# Patient Record
Sex: MALE | Race: WHITE | NOT HISPANIC OR LATINO | Employment: OTHER
[De-identification: names, ages, dates, MRNs, and addresses within clinical notes are randomized per-mention and may not be internally consistent; named-entity substitution may affect disease eponyms.]

---

## 2018-10-17 ENCOUNTER — HOSPITAL (OUTPATIENT)
Dept: OTHER | Age: 73
End: 2018-10-17
Attending: FAMILY MEDICINE

## 2021-12-16 ENCOUNTER — EXTERNAL RECORD (OUTPATIENT)
Dept: HEALTH INFORMATION MANAGEMENT | Facility: OTHER | Age: 76
End: 2021-12-16

## 2022-02-01 ENCOUNTER — HOSPITAL ENCOUNTER (OUTPATIENT)
Dept: ULTRASOUND IMAGING | Age: 77
Discharge: HOME | End: 2022-02-01
Attending: INTERNAL MEDICINE

## 2022-02-01 DIAGNOSIS — T33.831A: ICD-10-CM

## 2022-02-01 DIAGNOSIS — T33.832A: ICD-10-CM

## 2022-02-01 PROCEDURE — 93925 LOWER EXTREMITY STUDY: CPT

## 2022-02-07 ENCOUNTER — TELEPHONE (OUTPATIENT)
Dept: CARDIOLOGY | Age: 77
End: 2022-02-07

## 2022-02-11 RX ORDER — LISINOPRIL 40 MG/1
40 TABLET ORAL DAILY
COMMUNITY
Start: 2021-12-16

## 2022-02-11 RX ORDER — HYDROCORTISONE 25 MG/G
1 CREAM TOPICAL EVERY 12 HOURS PRN
COMMUNITY
Start: 2022-01-21

## 2022-02-11 RX ORDER — ASPIRIN 81 MG/1
81 TABLET ORAL DAILY
COMMUNITY

## 2022-02-11 RX ORDER — METOPROLOL SUCCINATE 50 MG/1
50 TABLET, EXTENDED RELEASE ORAL DAILY
COMMUNITY
Start: 2021-12-16 | End: 2022-03-11 | Stop reason: ALTCHOICE

## 2022-02-11 RX ORDER — DICLOFENAC SODIUM 75 MG/1
75 TABLET, DELAYED RELEASE ORAL DAILY
COMMUNITY
Start: 2021-12-17

## 2022-02-11 RX ORDER — FAMOTIDINE 40 MG/1
40 TABLET, FILM COATED ORAL DAILY
COMMUNITY
Start: 2021-12-17

## 2022-02-11 RX ORDER — ATORVASTATIN CALCIUM 20 MG/1
20 TABLET, FILM COATED ORAL DAILY
COMMUNITY
Start: 2021-12-16

## 2022-02-25 ENCOUNTER — OFFICE VISIT (OUTPATIENT)
Dept: CARDIOLOGY | Age: 77
End: 2022-02-25

## 2022-02-25 VITALS
HEART RATE: 62 BPM | BODY MASS INDEX: 24.48 KG/M2 | WEIGHT: 171 LBS | HEIGHT: 70 IN | SYSTOLIC BLOOD PRESSURE: 170 MMHG | DIASTOLIC BLOOD PRESSURE: 80 MMHG

## 2022-02-25 DIAGNOSIS — I15.9 SECONDARY HYPERTENSION: Primary | ICD-10-CM

## 2022-02-25 DIAGNOSIS — E78.49 OTHER HYPERLIPIDEMIA: ICD-10-CM

## 2022-02-25 DIAGNOSIS — I73.9 PAD (PERIPHERAL ARTERY DISEASE) (CMD): ICD-10-CM

## 2022-02-25 PROCEDURE — 3079F DIAST BP 80-89 MM HG: CPT | Performed by: INTERNAL MEDICINE

## 2022-02-25 PROCEDURE — 99205 OFFICE O/P NEW HI 60 MIN: CPT | Performed by: INTERNAL MEDICINE

## 2022-02-25 PROCEDURE — 3077F SYST BP >= 140 MM HG: CPT | Performed by: INTERNAL MEDICINE

## 2022-02-25 SDOH — HEALTH STABILITY: MENTAL HEALTH: DEPRESSION SCREENING SCORE: 0

## 2022-02-25 SDOH — HEALTH STABILITY: MENTAL HEALTH: LITTLE INTEREST OR PLEASURE IN ACTIVITY?: NOT AT ALL

## 2022-02-25 SDOH — HEALTH STABILITY: MENTAL HEALTH: FEELING DOWN, DEPRESSED OR HOPELESS?: NOT AT ALL

## 2022-02-25 SDOH — HEALTH STABILITY: PHYSICAL HEALTH: ON AVERAGE, HOW MANY DAYS PER WEEK DO YOU ENGAGE IN MODERATE TO STRENUOUS EXERCISE (LIKE A BRISK WALK)?: 0 DAYS

## 2022-02-25 SDOH — HEALTH STABILITY: PHYSICAL HEALTH: ON AVERAGE, HOW MANY MINUTES DO YOU ENGAGE IN EXERCISE AT THIS LEVEL?: 0 MIN

## 2022-02-25 SDOH — HEALTH STABILITY: MENTAL HEALTH: PHQ2 INTERPRETATION: NO FURTHER SCREENING NEEDED

## 2022-02-25 ASSESSMENT — PATIENT HEALTH QUESTIONNAIRE - PHQ9: SUM OF ALL RESPONSES TO PHQ9 QUESTIONS 1 AND 2: 0

## 2022-02-28 ENCOUNTER — TELEPHONE (OUTPATIENT)
Dept: CARDIOLOGY | Age: 77
End: 2022-02-28

## 2022-02-28 ENCOUNTER — PREP FOR CASE (OUTPATIENT)
Dept: CARDIOLOGY | Age: 77
End: 2022-02-28

## 2022-02-28 DIAGNOSIS — Z01.812 PRE-PROCEDURAL LABORATORY EXAMINATION: Primary | ICD-10-CM

## 2022-02-28 DIAGNOSIS — I15.9 SECONDARY HYPERTENSION: ICD-10-CM

## 2022-02-28 DIAGNOSIS — I73.9 PAD (PERIPHERAL ARTERY DISEASE) (CMD): Primary | ICD-10-CM

## 2022-02-28 DIAGNOSIS — I73.9 PAD (PERIPHERAL ARTERY DISEASE) (CMD): ICD-10-CM

## 2022-02-28 PROBLEM — E78.49 OTHER HYPERLIPIDEMIA: Status: ACTIVE | Noted: 2022-02-28

## 2022-02-28 RX ORDER — ASPIRIN 325 MG
325 TABLET ORAL ONCE
Status: CANCELLED | OUTPATIENT
Start: 2022-02-28 | End: 2022-02-28

## 2022-02-28 RX ORDER — SODIUM CHLORIDE 9 MG/ML
INJECTION, SOLUTION INTRAVENOUS CONTINUOUS
Status: CANCELLED | OUTPATIENT
Start: 2022-02-28

## 2022-03-01 ENCOUNTER — LAB SERVICES (OUTPATIENT)
Dept: LAB | Age: 77
End: 2022-03-01

## 2022-03-01 ENCOUNTER — TELEPHONE (OUTPATIENT)
Dept: CARDIOLOGY | Age: 77
End: 2022-03-01

## 2022-03-01 DIAGNOSIS — Z01.812 PRE-PROCEDURAL LABORATORY EXAMINATION: ICD-10-CM

## 2022-03-01 DIAGNOSIS — I15.9 SECONDARY HYPERTENSION: ICD-10-CM

## 2022-03-01 DIAGNOSIS — I73.9 PAD (PERIPHERAL ARTERY DISEASE) (CMD): ICD-10-CM

## 2022-03-01 LAB
ANION GAP SERPL CALC-SCNC: 11 MMOL/L (ref 10–20)
BASOPHILS # BLD: 0.1 K/MCL (ref 0–0.3)
BASOPHILS NFR BLD: 1 %
BUN SERPL-MCNC: 18 MG/DL (ref 6–20)
BUN/CREAT SERPL: 17 (ref 7–25)
CALCIUM SERPL-MCNC: 9.3 MG/DL (ref 8.4–10.2)
CHLORIDE SERPL-SCNC: 107 MMOL/L (ref 98–107)
CO2 SERPL-SCNC: 27 MMOL/L (ref 21–32)
CREAT SERPL-MCNC: 1.07 MG/DL (ref 0.67–1.17)
DEPRECATED RDW RBC: 41.1 FL (ref 39–50)
EOSINOPHIL # BLD: 0.2 K/MCL (ref 0–0.5)
EOSINOPHIL NFR BLD: 3 %
ERYTHROCYTE [DISTWIDTH] IN BLOOD: 11.5 % (ref 11–15)
FASTING DURATION TIME PATIENT: 0 HOURS (ref 0–999)
GFR SERPLBLD BASED ON 1.73 SQ M-ARVRAT: 67 ML/MIN
GLUCOSE SERPL-MCNC: 104 MG/DL (ref 70–99)
HCT VFR BLD CALC: 39.7 % (ref 39–51)
HGB BLD-MCNC: 12.7 G/DL (ref 13–17)
IMM GRANULOCYTES # BLD AUTO: 0 K/MCL (ref 0–0.2)
IMM GRANULOCYTES # BLD: 0 %
LYMPHOCYTES # BLD: 1.9 K/MCL (ref 1–4)
LYMPHOCYTES NFR BLD: 25 %
MCH RBC QN AUTO: 31.1 PG (ref 26–34)
MCHC RBC AUTO-ENTMCNC: 32 G/DL (ref 32–36.5)
MCV RBC AUTO: 97.3 FL (ref 78–100)
MONOCYTES # BLD: 1 K/MCL (ref 0.3–0.9)
MONOCYTES NFR BLD: 14 %
NEUTROPHILS # BLD: 4.4 K/MCL (ref 1.8–7.7)
NEUTROPHILS NFR BLD: 57 %
NRBC BLD MANUAL-RTO: 0 /100 WBC
PLATELET # BLD AUTO: 336 K/MCL (ref 140–450)
POTASSIUM SERPL-SCNC: 4.4 MMOL/L (ref 3.4–5.1)
RBC # BLD: 4.08 MIL/MCL (ref 4.5–5.9)
SARS-COV-2 RNA RESP QL NAA+PROBE: NOT DETECTED
SERVICE CMNT-IMP: NORMAL
SERVICE CMNT-IMP: NORMAL
SODIUM SERPL-SCNC: 141 MMOL/L (ref 135–145)
WBC # BLD: 7.6 K/MCL (ref 4.2–11)

## 2022-03-01 PROCEDURE — U0005 INFEC AGEN DETEC AMPLI PROBE: HCPCS | Performed by: PSYCHIATRY & NEUROLOGY

## 2022-03-01 PROCEDURE — 80048 BASIC METABOLIC PNL TOTAL CA: CPT | Performed by: PSYCHIATRY & NEUROLOGY

## 2022-03-01 PROCEDURE — 85025 COMPLETE CBC W/AUTO DIFF WBC: CPT | Performed by: PSYCHIATRY & NEUROLOGY

## 2022-03-01 PROCEDURE — U0003 INFECTIOUS AGENT DETECTION BY NUCLEIC ACID (DNA OR RNA); SEVERE ACUTE RESPIRATORY SYNDROME CORONAVIRUS 2 (SARS-COV-2) (CORONAVIRUS DISEASE [COVID-19]), AMPLIFIED PROBE TECHNIQUE, MAKING USE OF HIGH THROUGHPUT TECHNOLOGIES AS DESCRIBED BY CMS-2020-01-R: HCPCS | Performed by: PSYCHIATRY & NEUROLOGY

## 2022-03-01 PROCEDURE — 36415 COLL VENOUS BLD VENIPUNCTURE: CPT | Performed by: PSYCHIATRY & NEUROLOGY

## 2022-03-03 ENCOUNTER — HOSPITAL ENCOUNTER (OUTPATIENT)
Age: 77
Discharge: HOME | End: 2022-03-03
Attending: INTERNAL MEDICINE | Admitting: INTERNAL MEDICINE

## 2022-03-03 VITALS
SYSTOLIC BLOOD PRESSURE: 147 MMHG | HEART RATE: 58 BPM | DIASTOLIC BLOOD PRESSURE: 77 MMHG | BODY MASS INDEX: 24.49 KG/M2 | HEIGHT: 70 IN | RESPIRATION RATE: 12 BRPM | OXYGEN SATURATION: 99 % | WEIGHT: 171.08 LBS

## 2022-03-03 DIAGNOSIS — I73.9 PAD (PERIPHERAL ARTERY DISEASE) (CMD): ICD-10-CM

## 2022-03-03 PROCEDURE — 36247 INS CATH ABD/L-EXT ART 3RD: CPT | Performed by: INTERNAL MEDICINE

## 2022-03-03 PROCEDURE — 13000001 HB PHASE II RECOVERY EA 30 MINUTES: Performed by: INTERNAL MEDICINE

## 2022-03-03 PROCEDURE — C1887 CATHETER, GUIDING: HCPCS | Performed by: INTERNAL MEDICINE

## 2022-03-03 PROCEDURE — 75716 ARTERY X-RAYS ARMS/LEGS: CPT | Performed by: INTERNAL MEDICINE

## 2022-03-03 PROCEDURE — C1894 INTRO/SHEATH, NON-LASER: HCPCS | Performed by: INTERNAL MEDICINE

## 2022-03-03 PROCEDURE — C1760 CLOSURE DEV, VASC: HCPCS | Performed by: INTERNAL MEDICINE

## 2022-03-03 PROCEDURE — G0269 OCCLUSIVE DEVICE IN VEIN ART: HCPCS | Performed by: INTERNAL MEDICINE

## 2022-03-03 PROCEDURE — 10006027 HB SUPPLY 278: Performed by: INTERNAL MEDICINE

## 2022-03-03 PROCEDURE — 10002800 HB RX 250 W HCPCS: Performed by: INTERNAL MEDICINE

## 2022-03-03 PROCEDURE — 76937 US GUIDE VASCULAR ACCESS: CPT | Performed by: INTERNAL MEDICINE

## 2022-03-03 PROCEDURE — 99153 MOD SED SAME PHYS/QHP EA: CPT | Performed by: INTERNAL MEDICINE

## 2022-03-03 PROCEDURE — 10002805 HB CONTRAST AGENT: Performed by: INTERNAL MEDICINE

## 2022-03-03 PROCEDURE — C1769 GUIDE WIRE: HCPCS | Performed by: INTERNAL MEDICINE

## 2022-03-03 PROCEDURE — 75774 ARTERY X-RAY EACH VESSEL: CPT | Performed by: INTERNAL MEDICINE

## 2022-03-03 PROCEDURE — 99152 MOD SED SAME PHYS/QHP 5/>YRS: CPT | Performed by: INTERNAL MEDICINE

## 2022-03-03 PROCEDURE — 10006023 HB SUPPLY 272: Performed by: INTERNAL MEDICINE

## 2022-03-03 PROCEDURE — 10002801 HB RX 250 W/O HCPCS: Performed by: INTERNAL MEDICINE

## 2022-03-03 DEVICE — MYNXGRIP 5F
Type: IMPLANTABLE DEVICE | Site: GROIN | Status: FUNCTIONAL
Brand: MYNXGRIP

## 2022-03-03 RX ORDER — SODIUM CHLORIDE 9 MG/ML
INJECTION, SOLUTION INTRAVENOUS CONTINUOUS
Status: DISCONTINUED | OUTPATIENT
Start: 2022-03-03 | End: 2022-03-03 | Stop reason: HOSPADM

## 2022-03-03 RX ORDER — ASPIRIN 81 MG/1
TABLET, CHEWABLE ORAL
Status: DISCONTINUED
Start: 2022-03-03 | End: 2022-03-03 | Stop reason: HOSPADM

## 2022-03-03 RX ORDER — 0.9 % SODIUM CHLORIDE 0.9 %
2 VIAL (ML) INJECTION EVERY 12 HOURS SCHEDULED
Status: DISCONTINUED | OUTPATIENT
Start: 2022-03-03 | End: 2022-03-03 | Stop reason: HOSPADM

## 2022-03-03 RX ORDER — ASPIRIN 325 MG
325 TABLET ORAL ONCE
Status: DISCONTINUED | OUTPATIENT
Start: 2022-03-03 | End: 2022-03-03 | Stop reason: HOSPADM

## 2022-03-03 RX ORDER — MIDAZOLAM HYDROCHLORIDE 1 MG/ML
INJECTION, SOLUTION INTRAMUSCULAR; INTRAVENOUS PRN
Status: DISCONTINUED | OUTPATIENT
Start: 2022-03-03 | End: 2022-03-03 | Stop reason: HOSPADM

## 2022-03-03 RX ORDER — LIDOCAINE HYDROCHLORIDE 10 MG/ML
INJECTION, SOLUTION EPIDURAL; INFILTRATION; INTRACAUDAL; PERINEURAL PRN
Status: DISCONTINUED | OUTPATIENT
Start: 2022-03-03 | End: 2022-03-03 | Stop reason: HOSPADM

## 2022-03-03 RX ORDER — IODIXANOL 320 MG/ML
INJECTION, SOLUTION INTRAVASCULAR PRN
Status: DISCONTINUED | OUTPATIENT
Start: 2022-03-03 | End: 2022-03-03 | Stop reason: HOSPADM

## 2022-03-03 RX ORDER — ASPIRIN 81 MG/1
81 TABLET, CHEWABLE ORAL DAILY
Status: DISCONTINUED | OUTPATIENT
Start: 2022-03-04 | End: 2022-03-03 | Stop reason: HOSPADM

## 2022-03-03 ASSESSMENT — PAIN SCALES - GENERAL
PAINLEVEL_OUTOF10: 0

## 2022-03-04 ENCOUNTER — TELEPHONE (OUTPATIENT)
Dept: CARDIOLOGY | Age: 77
End: 2022-03-04

## 2022-03-07 ENCOUNTER — TELEPHONE (OUTPATIENT)
Dept: CARDIOLOGY | Age: 77
End: 2022-03-07

## 2022-03-11 ENCOUNTER — OFFICE VISIT (OUTPATIENT)
Dept: CARDIOLOGY | Age: 77
End: 2022-03-11

## 2022-03-11 VITALS
DIASTOLIC BLOOD PRESSURE: 76 MMHG | HEIGHT: 70 IN | SYSTOLIC BLOOD PRESSURE: 150 MMHG | BODY MASS INDEX: 24.62 KG/M2 | HEART RATE: 66 BPM | WEIGHT: 172 LBS

## 2022-03-11 DIAGNOSIS — I10 PRIMARY HYPERTENSION: ICD-10-CM

## 2022-03-11 DIAGNOSIS — Z09 HOSPITAL DISCHARGE FOLLOW-UP: ICD-10-CM

## 2022-03-11 DIAGNOSIS — I73.9 PAD (PERIPHERAL ARTERY DISEASE) (CMD): Primary | ICD-10-CM

## 2022-03-11 PROCEDURE — 3078F DIAST BP <80 MM HG: CPT | Performed by: NURSE PRACTITIONER

## 2022-03-11 PROCEDURE — 3077F SYST BP >= 140 MM HG: CPT | Performed by: NURSE PRACTITIONER

## 2022-03-11 PROCEDURE — 99214 OFFICE O/P EST MOD 30 MIN: CPT | Performed by: NURSE PRACTITIONER

## 2022-03-11 RX ORDER — CARVEDILOL 12.5 MG/1
12.5 TABLET ORAL 2 TIMES DAILY WITH MEALS
Qty: 60 TABLET | Refills: 3 | Status: SHIPPED | OUTPATIENT
Start: 2022-03-11 | End: 2023-05-11 | Stop reason: SDUPTHER

## 2022-03-11 SDOH — HEALTH STABILITY: PHYSICAL HEALTH: ON AVERAGE, HOW MANY MINUTES DO YOU ENGAGE IN EXERCISE AT THIS LEVEL?: 0 MIN

## 2022-03-11 SDOH — HEALTH STABILITY: PHYSICAL HEALTH: ON AVERAGE, HOW MANY DAYS PER WEEK DO YOU ENGAGE IN MODERATE TO STRENUOUS EXERCISE (LIKE A BRISK WALK)?: 0 DAYS

## 2022-03-11 ASSESSMENT — PATIENT HEALTH QUESTIONNAIRE - PHQ9
SUM OF ALL RESPONSES TO PHQ9 QUESTIONS 1 AND 2: 0
SUM OF ALL RESPONSES TO PHQ9 QUESTIONS 1 AND 2: 0
CLINICAL INTERPRETATION OF PHQ2 SCORE: NO FURTHER SCREENING NEEDED
2. FEELING DOWN, DEPRESSED OR HOPELESS: NOT AT ALL
1. LITTLE INTEREST OR PLEASURE IN DOING THINGS: NOT AT ALL

## 2022-03-18 ENCOUNTER — APPOINTMENT (OUTPATIENT)
Dept: CARDIOLOGY | Age: 77
End: 2022-03-18

## 2022-07-20 RX ORDER — CARVEDILOL 12.5 MG/1
12.5 TABLET ORAL 2 TIMES DAILY WITH MEALS
Qty: 60 TABLET | Refills: 2 | OUTPATIENT
Start: 2022-07-20

## 2023-03-03 ENCOUNTER — TELEPHONE (OUTPATIENT)
Dept: CARDIOLOGY | Age: 78
End: 2023-03-03

## 2023-03-06 ENCOUNTER — OFFICE VISIT (OUTPATIENT)
Dept: CARDIOLOGY | Age: 78
End: 2023-03-06

## 2023-03-06 VITALS
OXYGEN SATURATION: 99 % | DIASTOLIC BLOOD PRESSURE: 71 MMHG | SYSTOLIC BLOOD PRESSURE: 137 MMHG | HEART RATE: 67 BPM | BODY MASS INDEX: 24.29 KG/M2 | RESPIRATION RATE: 16 BRPM | WEIGHT: 169.64 LBS | HEIGHT: 70 IN

## 2023-03-06 DIAGNOSIS — I10 PRIMARY HYPERTENSION: ICD-10-CM

## 2023-03-06 DIAGNOSIS — I15.9 SECONDARY HYPERTENSION: ICD-10-CM

## 2023-03-06 DIAGNOSIS — E78.49 OTHER HYPERLIPIDEMIA: ICD-10-CM

## 2023-03-06 DIAGNOSIS — I73.9 PAD (PERIPHERAL ARTERY DISEASE) (CMD): Primary | ICD-10-CM

## 2023-03-06 PROCEDURE — 99214 OFFICE O/P EST MOD 30 MIN: CPT | Performed by: INTERNAL MEDICINE

## 2023-03-06 PROCEDURE — 3075F SYST BP GE 130 - 139MM HG: CPT | Performed by: INTERNAL MEDICINE

## 2023-03-06 PROCEDURE — 3078F DIAST BP <80 MM HG: CPT | Performed by: INTERNAL MEDICINE

## 2023-03-06 RX ORDER — DONEPEZIL HYDROCHLORIDE 5 MG/1
TABLET, FILM COATED ORAL
COMMUNITY
Start: 2023-03-02 | End: 2023-04-28

## 2023-03-06 RX ORDER — AZITHROMYCIN 250 MG/1
TABLET, FILM COATED ORAL
COMMUNITY
Start: 2022-12-10 | End: 2023-04-28

## 2023-03-06 RX ORDER — CLOPIDOGREL BISULFATE 75 MG/1
TABLET ORAL
COMMUNITY
Start: 2023-02-02 | End: 2023-04-28 | Stop reason: DRUGHIGH

## 2023-03-06 RX ORDER — CEPHALEXIN 500 MG/1
CAPSULE ORAL
COMMUNITY
Start: 2023-02-02 | End: 2023-04-28

## 2023-03-16 ENCOUNTER — TELEPHONE (OUTPATIENT)
Dept: CARDIOLOGY | Age: 78
End: 2023-03-16

## 2023-03-16 DIAGNOSIS — I73.9 PAD (PERIPHERAL ARTERY DISEASE) (CMD): ICD-10-CM

## 2023-03-21 ENCOUNTER — HOSPITAL ENCOUNTER (OUTPATIENT)
Dept: LAB | Age: 78
Discharge: HOME | End: 2023-03-21
Attending: INTERNAL MEDICINE

## 2023-03-21 ENCOUNTER — HOSPITAL ENCOUNTER (OUTPATIENT)
Dept: GENERAL RADIOLOGY | Age: 78
Discharge: HOME | End: 2023-03-21

## 2023-03-21 DIAGNOSIS — R07.9 CHEST PAIN, UNSPECIFIED: ICD-10-CM

## 2023-03-21 DIAGNOSIS — R07.9 CHEST PAIN: ICD-10-CM

## 2023-03-21 DIAGNOSIS — R07.9 CHEST PAIN, UNSPECIFIED: Primary | ICD-10-CM

## 2023-03-21 PROCEDURE — 71046 X-RAY EXAM CHEST 2 VIEWS: CPT

## 2023-03-21 PROCEDURE — 36415 COLL VENOUS BLD VENIPUNCTURE: CPT | Performed by: INTERNAL MEDICINE

## 2023-03-21 PROCEDURE — 85379 FIBRIN DEGRADATION QUANT: CPT | Performed by: INTERNAL MEDICINE

## 2023-03-22 ENCOUNTER — HOSPITAL ENCOUNTER (OUTPATIENT)
Dept: LAB | Age: 78
Discharge: HOME | End: 2023-03-22
Attending: INTERNAL MEDICINE

## 2023-03-22 DIAGNOSIS — Z12.5 ENCOUNTER FOR SCREENING FOR MALIGNANT NEOPLASM OF PROSTATE: ICD-10-CM

## 2023-03-22 DIAGNOSIS — R41.3 OTHER AMNESIA: ICD-10-CM

## 2023-03-22 DIAGNOSIS — I10 ESSENTIAL (PRIMARY) HYPERTENSION: ICD-10-CM

## 2023-03-22 DIAGNOSIS — R41.3 OTHER AMNESIA: Primary | ICD-10-CM

## 2023-03-22 DIAGNOSIS — I25.10 ATHEROSCLEROTIC HEART DISEASE OF NATIVE CORONARY ARTERY WITHOUT ANGINA PECTORIS: ICD-10-CM

## 2023-03-22 DIAGNOSIS — K21.00 GASTRO-ESOPHAGEAL REFLUX DISEASE WITH ESOPHAGITIS, WITHOUT BLEEDING: ICD-10-CM

## 2023-03-22 LAB
ALBUMIN SERPL-MCNC: 4.1 G/DL (ref 3.6–5.1)
ALBUMIN/GLOB SERPL: 1.2 {RATIO} (ref 1–2.4)
ALP SERPL-CCNC: 84 UNITS/L (ref 45–117)
ALT SERPL-CCNC: 24 UNITS/L
ANION GAP SERPL CALC-SCNC: 9 MMOL/L (ref 7–19)
APPEARANCE UR: CLEAR
AST SERPL-CCNC: 18 UNITS/L
BACTERIA #/AREA URNS HPF: ABNORMAL /HPF
BASOPHILS # BLD: 0.1 K/MCL (ref 0–0.3)
BASOPHILS NFR BLD: 1 %
BILIRUB SERPL-MCNC: 0.7 MG/DL (ref 0.2–1)
BILIRUB UR QL STRIP: NEGATIVE
BUN SERPL-MCNC: 23 MG/DL (ref 6–20)
BUN/CREAT SERPL: 17 (ref 7–25)
CALCIUM SERPL-MCNC: 9.4 MG/DL (ref 8.4–10.2)
CHLORIDE SERPL-SCNC: 105 MMOL/L (ref 97–110)
CHOLEST SERPL-MCNC: 147 MG/DL
CHOLEST/HDLC SERPL: 2 {RATIO}
CO2 SERPL-SCNC: 29 MMOL/L (ref 21–32)
COLOR UR: YELLOW
CREAT SERPL-MCNC: 1.33 MG/DL (ref 0.67–1.17)
D DIMER PPP FEU-MCNC: 0.41 MG/L (FEU)
DEPRECATED RDW RBC: 43.8 FL (ref 39–50)
EOSINOPHIL # BLD: 0.3 K/MCL (ref 0–0.5)
EOSINOPHIL NFR BLD: 4 %
ERYTHROCYTE [DISTWIDTH] IN BLOOD: 12.1 % (ref 11–15)
FASTING DURATION TIME PATIENT: ABNORMAL H
FASTING DURATION TIME PATIENT: NORMAL H
FOLATE SERPL-MCNC: 8.3 NG/ML
GFR SERPLBLD BASED ON 1.73 SQ M-ARVRAT: 55 ML/MIN
GLOBULIN SER-MCNC: 3.5 G/DL (ref 2–4)
GLUCOSE SERPL-MCNC: 98 MG/DL (ref 70–99)
GLUCOSE UR STRIP-MCNC: NEGATIVE MG/DL
HCT VFR BLD CALC: 38.7 % (ref 39–51)
HDLC SERPL-MCNC: 74 MG/DL
HGB BLD-MCNC: 12.2 G/DL (ref 13–17)
HGB UR QL STRIP: NEGATIVE
HYALINE CASTS #/AREA URNS LPF: ABNORMAL /LPF
IMM GRANULOCYTES # BLD AUTO: 0 K/MCL (ref 0–0.2)
IMM GRANULOCYTES # BLD: 0 %
KETONES UR STRIP-MCNC: NEGATIVE MG/DL
LDLC SERPL CALC-MCNC: 60 MG/DL
LEUKOCYTE ESTERASE UR QL STRIP: NEGATIVE
LYMPHOCYTES # BLD: 1.6 K/MCL (ref 1–4)
LYMPHOCYTES NFR BLD: 19 %
MCH RBC QN AUTO: 31.1 PG (ref 26–34)
MCHC RBC AUTO-ENTMCNC: 31.5 G/DL (ref 32–36.5)
MCV RBC AUTO: 98.7 FL (ref 78–100)
MONOCYTES # BLD: 0.9 K/MCL (ref 0.3–0.9)
MONOCYTES NFR BLD: 11 %
MUCOUS THREADS URNS QL MICRO: PRESENT
NEUTROPHILS # BLD: 5.3 K/MCL (ref 1.8–7.7)
NEUTROPHILS NFR BLD: 65 %
NITRITE UR QL STRIP: NEGATIVE
NONHDLC SERPL-MCNC: 73 MG/DL
NRBC BLD MANUAL-RTO: 0 /100 WBC
PH UR STRIP: 6 [PH] (ref 5–7)
PLATELET # BLD AUTO: 377 K/MCL (ref 140–450)
POTASSIUM SERPL-SCNC: 4.4 MMOL/L (ref 3.4–5.1)
PROT SERPL-MCNC: 7.6 G/DL (ref 6.4–8.2)
PROT UR STRIP-MCNC: 30 MG/DL
PSA SERPL-MCNC: 2.4 NG/ML
RBC # BLD: 3.92 MIL/MCL (ref 4.5–5.9)
RBC #/AREA URNS HPF: ABNORMAL /HPF
SODIUM SERPL-SCNC: 139 MMOL/L (ref 135–145)
SP GR UR STRIP: 1.03 (ref 1–1.03)
SQUAMOUS #/AREA URNS HPF: ABNORMAL /HPF
TRIGL SERPL-MCNC: 63 MG/DL
TSH SERPL-ACNC: 0.98 MCUNITS/ML (ref 0.35–5)
UROBILINOGEN UR STRIP-MCNC: 0.2 MG/DL
VIT B12 SERPL-MCNC: 587 PG/ML (ref 211–911)
WBC # BLD: 8.2 K/MCL (ref 4.2–11)
WBC #/AREA URNS HPF: ABNORMAL /HPF

## 2023-03-22 PROCEDURE — 82746 ASSAY OF FOLIC ACID SERUM: CPT | Performed by: INTERNAL MEDICINE

## 2023-03-22 PROCEDURE — 80053 COMPREHEN METABOLIC PANEL: CPT | Performed by: INTERNAL MEDICINE

## 2023-03-22 PROCEDURE — 84443 ASSAY THYROID STIM HORMONE: CPT | Performed by: INTERNAL MEDICINE

## 2023-03-22 PROCEDURE — 36415 COLL VENOUS BLD VENIPUNCTURE: CPT | Performed by: INTERNAL MEDICINE

## 2023-03-22 PROCEDURE — 84153 ASSAY OF PSA TOTAL: CPT | Performed by: INTERNAL MEDICINE

## 2023-03-22 PROCEDURE — 81001 URINALYSIS AUTO W/SCOPE: CPT | Performed by: INTERNAL MEDICINE

## 2023-03-22 PROCEDURE — 80061 LIPID PANEL: CPT | Performed by: INTERNAL MEDICINE

## 2023-03-22 PROCEDURE — 85025 COMPLETE CBC W/AUTO DIFF WBC: CPT | Performed by: INTERNAL MEDICINE

## 2023-04-10 ENCOUNTER — APPOINTMENT (OUTPATIENT)
Dept: CARDIOLOGY | Age: 78
End: 2023-04-10

## 2023-04-17 ENCOUNTER — APPOINTMENT (OUTPATIENT)
Dept: CARDIOLOGY | Age: 78
End: 2023-04-17

## 2023-04-28 ENCOUNTER — OFFICE VISIT (OUTPATIENT)
Dept: CARDIOLOGY | Age: 78
End: 2023-04-28

## 2023-04-28 VITALS
HEART RATE: 72 BPM | HEIGHT: 69 IN | SYSTOLIC BLOOD PRESSURE: 120 MMHG | WEIGHT: 163.14 LBS | DIASTOLIC BLOOD PRESSURE: 79 MMHG | BODY MASS INDEX: 24.16 KG/M2

## 2023-04-28 DIAGNOSIS — I15.9 SECONDARY HYPERTENSION: ICD-10-CM

## 2023-04-28 DIAGNOSIS — I73.9 PAD (PERIPHERAL ARTERY DISEASE) (CMD): ICD-10-CM

## 2023-04-28 DIAGNOSIS — E78.49 OTHER HYPERLIPIDEMIA: Primary | ICD-10-CM

## 2023-04-28 DIAGNOSIS — I10 PRIMARY HYPERTENSION: ICD-10-CM

## 2023-04-28 PROCEDURE — 99214 OFFICE O/P EST MOD 30 MIN: CPT | Performed by: INTERNAL MEDICINE

## 2023-04-28 PROCEDURE — 3078F DIAST BP <80 MM HG: CPT | Performed by: INTERNAL MEDICINE

## 2023-04-28 PROCEDURE — 3074F SYST BP LT 130 MM HG: CPT | Performed by: INTERNAL MEDICINE

## 2023-04-28 SDOH — HEALTH STABILITY: PHYSICAL HEALTH: ON AVERAGE, HOW MANY DAYS PER WEEK DO YOU ENGAGE IN MODERATE TO STRENUOUS EXERCISE (LIKE A BRISK WALK)?: 0 DAYS

## 2023-04-28 SDOH — HEALTH STABILITY: PHYSICAL HEALTH: ON AVERAGE, HOW MANY MINUTES DO YOU ENGAGE IN EXERCISE AT THIS LEVEL?: 0 MIN

## 2023-04-28 ASSESSMENT — PATIENT HEALTH QUESTIONNAIRE - PHQ9
SUM OF ALL RESPONSES TO PHQ9 QUESTIONS 1 AND 2: 0
2. FEELING DOWN, DEPRESSED OR HOPELESS: NOT AT ALL
CLINICAL INTERPRETATION OF PHQ2 SCORE: NO FURTHER SCREENING NEEDED
1. LITTLE INTEREST OR PLEASURE IN DOING THINGS: NOT AT ALL
SUM OF ALL RESPONSES TO PHQ9 QUESTIONS 1 AND 2: 0

## 2023-05-03 ENCOUNTER — TELEPHONE (OUTPATIENT)
Dept: CARDIOLOGY | Age: 78
End: 2023-05-03

## 2023-05-03 DIAGNOSIS — I73.9 PAD (PERIPHERAL ARTERY DISEASE) (CMD): ICD-10-CM

## 2023-05-10 ENCOUNTER — TELEPHONE (OUTPATIENT)
Dept: CARDIOLOGY | Age: 78
End: 2023-05-10

## 2023-05-11 RX ORDER — CARVEDILOL 25 MG/1
25 TABLET ORAL 2 TIMES DAILY WITH MEALS
Qty: 60 TABLET | Refills: 5 | Status: SHIPPED | OUTPATIENT
Start: 2023-05-11 | End: 2023-06-05 | Stop reason: SDUPTHER

## 2023-06-05 ENCOUNTER — OFFICE VISIT (OUTPATIENT)
Dept: CARDIOLOGY | Age: 78
End: 2023-06-05

## 2023-06-05 VITALS
OXYGEN SATURATION: 98 % | WEIGHT: 163.8 LBS | HEART RATE: 73 BPM | HEIGHT: 69 IN | DIASTOLIC BLOOD PRESSURE: 67 MMHG | SYSTOLIC BLOOD PRESSURE: 114 MMHG | BODY MASS INDEX: 24.26 KG/M2

## 2023-06-05 DIAGNOSIS — I73.9 PAD (PERIPHERAL ARTERY DISEASE) (CMD): ICD-10-CM

## 2023-06-05 DIAGNOSIS — I10 PRIMARY HYPERTENSION: Primary | ICD-10-CM

## 2023-06-05 DIAGNOSIS — E78.49 OTHER HYPERLIPIDEMIA: ICD-10-CM

## 2023-06-05 PROBLEM — I15.9 SECONDARY HYPERTENSION: Status: RESOLVED | Noted: 2022-02-28 | Resolved: 2023-06-05

## 2023-06-05 PROCEDURE — 99213 OFFICE O/P EST LOW 20 MIN: CPT | Performed by: NURSE PRACTITIONER

## 2023-06-05 PROCEDURE — 3078F DIAST BP <80 MM HG: CPT | Performed by: NURSE PRACTITIONER

## 2023-06-05 PROCEDURE — 3074F SYST BP LT 130 MM HG: CPT | Performed by: NURSE PRACTITIONER

## 2023-06-05 RX ORDER — CARVEDILOL 25 MG/1
25 TABLET ORAL EVERY MORNING
Qty: 30 TABLET | Refills: 3 | Status: SHIPPED | OUTPATIENT
Start: 2023-06-05

## 2023-06-05 RX ORDER — CARVEDILOL 12.5 MG/1
12.5 TABLET ORAL AT BEDTIME
Qty: 30 TABLET | Refills: 3 | Status: SHIPPED | OUTPATIENT
Start: 2023-06-05

## 2023-06-05 SDOH — HEALTH STABILITY: MENTAL HEALTH: DEPRESSION SCREENING SCORE: 0

## 2023-06-05 SDOH — HEALTH STABILITY: MENTAL HEALTH: PHQ2 INTERPRETATION: NO FURTHER SCREENING NEEDED

## 2023-06-05 SDOH — HEALTH STABILITY: MENTAL HEALTH: LITTLE INTEREST OR PLEASURE IN ACTIVITY?: NOT AT ALL

## 2023-06-05 SDOH — HEALTH STABILITY: PHYSICAL HEALTH: ON AVERAGE, HOW MANY MINUTES DO YOU ENGAGE IN EXERCISE AT THIS LEVEL?: 0 MIN

## 2023-06-05 SDOH — HEALTH STABILITY: MENTAL HEALTH: FEELING DOWN, DEPRESSED OR HOPELESS?: NOT AT ALL

## 2023-06-05 SDOH — HEALTH STABILITY: PHYSICAL HEALTH: ON AVERAGE, HOW MANY DAYS PER WEEK DO YOU ENGAGE IN MODERATE TO STRENUOUS EXERCISE (LIKE A BRISK WALK)?: 0 DAYS

## 2023-06-05 ASSESSMENT — PATIENT HEALTH QUESTIONNAIRE - PHQ9: SUM OF ALL RESPONSES TO PHQ9 QUESTIONS 1 AND 2: 0

## 2023-06-28 ENCOUNTER — ORDER TRANSCRIPTION (OUTPATIENT)
Dept: PHYSICAL THERAPY | Facility: HOSPITAL | Age: 78
End: 2023-06-28

## 2023-06-28 DIAGNOSIS — M54.16 LUMBAR RADICULOPATHY: Primary | ICD-10-CM

## 2023-07-19 ENCOUNTER — TELEPHONE (OUTPATIENT)
Dept: PHYSICAL THERAPY | Facility: HOSPITAL | Age: 78
End: 2023-07-19

## 2023-07-19 ENCOUNTER — APPOINTMENT (OUTPATIENT)
Dept: PHYSICAL THERAPY | Age: 78
End: 2023-07-19
Attending: INTERNAL MEDICINE
Payer: MEDICARE

## 2023-07-26 ENCOUNTER — OFFICE VISIT (OUTPATIENT)
Dept: PHYSICAL THERAPY | Age: 78
End: 2023-07-26
Attending: INTERNAL MEDICINE
Payer: MEDICARE

## 2023-07-26 DIAGNOSIS — M54.16 LUMBAR RADICULOPATHY: Primary | ICD-10-CM

## 2023-07-26 PROCEDURE — 97161 PT EVAL LOW COMPLEX 20 MIN: CPT

## 2023-07-26 PROCEDURE — 97140 MANUAL THERAPY 1/> REGIONS: CPT

## 2023-07-31 NOTE — PROGRESS NOTES
PT DAILY NOTE  Diagnosis:   Lumbar radiculopathy (M54.16)    Referring Provider: Scottie Mendieta MD Date of Evaluation:    7/26/2023    Precautions:  None Next MD visit:   none scheduled  Date of Surgery: n/a  Symptom onset: chronic 5 yrs, worse past few months     Insurance Primary/Secondary: [employer]     Visit 2 of 8   Date POC Expires: 9-30-23       Subjective: Pt states after last visit he felt less stress on his spine. He feels increased symptoms after standing for about 30 min, he feels numbness in Lf toes, and he fears the 'shock' of pain will follow. Has been doing his stretches at least x2/day. He hasn't had any overnight LE cramping since last visit. Pain: 0/10 no pain thus far today  @eval: Darlin Luong is a 68year old male who presents to therapy today with complaints of intermittent LBP and Rt sciatica over several years on-off, worse in past few months c gardening and yardwork. Has heard dry needling helps and would like to try. Previous bouts of PT for this issue but it has been at least 5 yrs. B low back and into Rt buttock to knee or ankle. Intermittent foot numbness. Severe 'shocks' of pain and BLE cramping overnight (some improvement c medication adjustment 6-8 mo ago), disturbed sleep. Feels weakness in BLEs after standing >30 min. Pt reports \"L5 disc issue, degeneration\", had injections x3 in 1997 c some improvement; repeated in 2020 x2 c some relief. Takes Diclofenac nightly, which helps. Has not been holding back on any activities. Likes treadmill and feels it helps, but hasn't done lately 2o needing to be fixed. Work :Used to work in car sales which requires prolonged standing. Has considered returning, but not with this pain. Pt describes pain level at best 0/10, at worst 10/10. Current functional limitations include intermittent pains at any time, BLE cramping overnight makes his legs feel weak.    Eriberto Lisavonnie describes prior level of function decreased severity and frequency of pain, less cramping/weakness. Pt goals include decrease pain and LE cramping/feeling of weakness. Past medical history was reviewed with Mellissa Alvarez. Significant findings include hyperlipidemia, Htn, CAD, HLD, HTN, PAD   Pt denies diplopia, dysarthria, dysphasia, dizziness, drop attacks, bowel/bladder changes, saddle anesthesia, and ZAYDA LE N/T. Objective:   Post-DN pt's gait not notably changed. Continued mild forward flexed gait and occasional drift to Rt  Hypomobility to central PA T-L spine noted  Then added thoracolumbar stretches into rotation and extension, after which pt demo notably more upright posture during gait. Added to Hep per below. Treatment:  (\"NP\" indicates Not Performed this date)  Manual Therapy:  Pt educated on intramuscular trigger point dry needling via solid filament needles. Instructions, precautions, risks and benefits thoroughly explained. Obtained verbal informed consent prior to application of dry needling treatment on 7-27-23 and agreed to treatment today. Pt denied any of the following relative or absolute contraindications (on initial consent date above): hx of fainting/fear of needles, hx of bleeding disorder c impaired blood clotting, currently taking anticoagulants, pregnancy, currently taking antibiotics for infection, damaged heart valve or other risk of infection, presence of cancer or taking immunosuppressants, diabetic/impaired wound healing, metal allergy. Technique applied today to the following muscles: Lf vastus lateralis, Lf TFL, Lf lateral hamstring  Functional assessment sign (pre-test): steadiness of / upright gait  Adverse effects (Y/N): N  Re-assessment sign (post-test): minimal change    Proper hemostasis was applied post-treatment to limit bruising/bleeding. Pt educated to drink plenty of water, ice/heat the area PRN, and the possibility of post-tx muscle soreness/fatigue. Pt confirmed understanding.     Added central PA T4-S1 spine gr 2-3    Neuromuscular Re-education:    Therapeutic Exercise:  Knee to opposite shoulder stretch 20\"x3 ea Rt, Lf   Added seated core stab c minimarch 2min  Added hooklying LTR 2min  Added GUSTAVO 5\"x10  Added Seated thoracic ext c HBH 3\"x10    HEP:   Intial- Knee to opposite shoulder stretch  8-1: add LTR, GUSTAVO, seated thoracic ext    Assessment/Plan: pt has some LLE muscle tightness that may contribute to his symtpoms, added DN as detailed above. However pt's T-L segmental hypomobility may be greater factor - after manual joint mobs as well as self stretches into both rotation and into extension, pt demo improved upright posture during gait. Additions to HEP as detailed above to improve spinal extension. Cont to address deficits. PLAN OF CARE:    Goals: (to be met in 8 visits)   Consistently decr pain < or = 3/10 intermittent for incr QOL and activity tolerance  Overall incr in function as indicated by decr Oswestry at least 10 pts  Pt reports decr frequency of shooting pains for incr QOL  Pt reports decr feeling of LE weakness and cramping for incr QOL  Indep HEP to promote cont progress toward functional goals    Frequency / Duration: Patient will be seen for 1-2 x/week or a total of 8 visits over a 90 day period. All Treatments performed at distinct and separate times during the therapy session.   Treatment Minutes  Units charged   Manual Therapy 32 minutes 2   Therapeutic Activity 0 minutes    Neuromuscular Re-education 0 minutes    Therapeutic Exercise 18 minutes 1   Total Direct Treatment Time 50 minutes

## 2023-08-01 ENCOUNTER — OFFICE VISIT (OUTPATIENT)
Dept: PHYSICAL THERAPY | Age: 78
End: 2023-08-01
Attending: INTERNAL MEDICINE
Payer: MEDICARE

## 2023-08-01 DIAGNOSIS — M54.16 LUMBAR RADICULOPATHY: Primary | ICD-10-CM

## 2023-08-01 PROCEDURE — 97110 THERAPEUTIC EXERCISES: CPT

## 2023-08-01 PROCEDURE — 97140 MANUAL THERAPY 1/> REGIONS: CPT

## 2023-08-07 NOTE — PROGRESS NOTES
PT DAILY NOTE  Diagnosis:   Lumbar radiculopathy (M54.16)    Referring Provider: Ambrocio Partida MD Date of Evaluation:    7/26/2023    Precautions:  None Next MD visit:   none scheduled  Date of Surgery: n/a  Symptom onset: chronic 5 yrs, worse past few months     Insurance Primary/Secondary: [employer]     Visit 3 of 8   Date POC Expires: 9-30-23       Subjective: After last visit, he worked in the yard the next day for 1.5 hours, and starting the day after that his pain was bad, hasn't tried things like that in 4-5 days since it happened. In addition, pt states 10 minutes ago he sprained his ankle when coming down the stairs. Pain: 0/10 no pain thus far today in back/leg  @eval: Tresa Ng is a 68year old male who presents to therapy today with complaints of intermittent LBP and Rt sciatica over several years on-off, worse in past few months c gardening and yardwork. Has heard dry needling helps and would like to try. Previous bouts of PT for this issue but it has been at least 5 yrs. B low back and into Rt buttock to knee or ankle. Intermittent foot numbness. Severe 'shocks' of pain and BLE cramping overnight (some improvement c medication adjustment 6-8 mo ago), disturbed sleep. Feels weakness in BLEs after standing >30 min. Pt reports \"L5 disc issue, degeneration\", had injections x3 in 1997 c some improvement; repeated in 2020 x2 c some relief. Takes Diclofenac nightly, which helps. Has not been holding back on any activities. Likes treadmill and feels it helps, but hasn't done lately 2o needing to be fixed. Work :Used to work in car sales which requires prolonged standing. Has considered returning, but not with this pain. Pt describes pain level at best 0/10, at worst 10/10. Current functional limitations include intermittent pains at any time, BLE cramping overnight makes his legs feel weak. Michael Burdick describes prior level of function decreased severity and frequency of pain, less cramping/weakness.  Pt goals include decrease pain and LE cramping/feeling of weakness. Past medical history was reviewed with Dwayne Fowler. Significant findings include hyperlipidemia, Htn, CAD, HLD, HTN, PAD   Pt denies diplopia, dysarthria, dysphasia, dizziness, drop attacks, bowel/bladder changes, saddle anesthesia, and ZAYDA LE N/T. Objective:   Discussed shorter bouts of yardwork (20-30min max) with breaks in between, to help limit symptom flareup  Discussed performing GUSTAVO exercise, directly following flexion based activities to counteract effects of flexion    Treatment:  (\"NP\" indicates Not Performed this date)  Manual Therapy:  Pt educated on intramuscular trigger point dry needling via solid filament needles. Instructions, precautions, risks and benefits thoroughly explained. Obtained verbal informed consent prior to application of dry needling treatment on 7-27-23 and agreed to treatment today. Pt denied any of the following relative or absolute contraindications (on initial consent date above): hx of fainting/fear of needles, hx of bleeding disorder c impaired blood clotting, currently taking anticoagulants, pregnancy, currently taking antibiotics for infection, damaged heart valve or other risk of infection, presence of cancer or taking immunosuppressants, diabetic/impaired wound healing, metal allergy. Technique applied today to the following muscles: Lf and Rt glut med/min, Lf and R QL, Lf and Rt psoas  Functional assessment sign (pre-test): none, symptoms are intermittent, has not had yet today  Adverse effects (Y/N): N  Re-assessment sign (post-test): n/a    Proper hemostasis was applied post-treatment to limit bruising/bleeding. Pt educated to drink plenty of water, ice/heat the area PRN, and the possibility of post-tx muscle soreness/fatigue. Pt confirmed understanding.     central PA T4-S1 spine gr 2-3-NP    Neuromuscular Re-education:    Therapeutic Exercise:  GUSTAVO 5\"x10  hooklying LTR 2min  Knee to opposite shoulder stretch 20\"x3 ea Rt, Lf   seated core stab c minimarch 2min  Seated thoracic ext c HBH 3\"x10    HEP:   Intial- Knee to opposite shoulder stretch  8-1: add LTR, GUSTAVO, seated thoracic ext    Assessment/Plan: pt notes day after last session, did 1.5 hours of flexion-based yardwork which flared up his pain for a few days. Encouragingly, he states he did not have pain while performing the yardwork, and that the pain was not as severe as previous occasions of gardening. Today he is back to painfree. Discussed proper body mechanics for gardening, performing at smaller durations c breaks between, and performing GUSTAVO exercise afterward. Pt consistently asymptomatic during therapy sessions as his symptoms are intermittent 'shocks' of pain that are short-duration and random in occurrence. Pt to self-manage for 2 wks and f/u to reassess. PLAN OF CARE:    Goals: (to be met in 8 visits)   Consistently decr pain < or = 3/10 intermittent for incr QOL and activity tolerance  Overall incr in function as indicated by decr Oswestry at least 10 pts  Pt reports decr frequency of shooting pains for incr QOL  Pt reports decr feeling of LE weakness and cramping for incr QOL  Indep HEP to promote cont progress toward functional goals    Frequency / Duration: Patient will be seen for 1-2 x/week or a total of 8 visits over a 90 day period. All Treatments performed at distinct and separate times during the therapy session.   Treatment Minutes  Units charged   Manual Therapy 25 minutes 2   Therapeutic Activity 0 minutes    Neuromuscular Re-education 0 minutes    Therapeutic Exercise 20 minutes 1   Total Direct Treatment Time 45 minutes

## 2023-08-08 ENCOUNTER — OFFICE VISIT (OUTPATIENT)
Dept: PHYSICAL THERAPY | Age: 78
End: 2023-08-08
Attending: INTERNAL MEDICINE
Payer: MEDICARE

## 2023-08-08 DIAGNOSIS — M54.16 LUMBAR RADICULOPATHY: Primary | ICD-10-CM

## 2023-08-08 PROCEDURE — 97140 MANUAL THERAPY 1/> REGIONS: CPT

## 2023-08-08 PROCEDURE — 97110 THERAPEUTIC EXERCISES: CPT

## 2023-08-21 ENCOUNTER — OFFICE VISIT (OUTPATIENT)
Dept: PHYSICAL THERAPY | Age: 78
End: 2023-08-21
Attending: INTERNAL MEDICINE
Payer: MEDICARE

## 2023-08-21 DIAGNOSIS — M54.16 LUMBAR RADICULOPATHY: Primary | ICD-10-CM

## 2023-08-21 PROCEDURE — 97112 NEUROMUSCULAR REEDUCATION: CPT

## 2023-08-21 PROCEDURE — 97140 MANUAL THERAPY 1/> REGIONS: CPT

## 2023-08-21 NOTE — PROGRESS NOTES
PT DAILY NOTE  Diagnosis:   Lumbar radiculopathy (M54.16)    Referring Provider: Juliocesar Vasquez MD Date of Evaluation:    7/26/2023    Precautions:  None Next MD visit:   none scheduled  Date of Surgery: n/a  Symptom onset: chronic 5 yrs, worse past few months     Insurance Primary/Secondary[facility]ignon [facility]     Visit 4 of 8   Date POC Expires: 9-30-23       Subjective: Pt states he feels the 'shocks of pain' are not as sharp in nature as they used to be. If he is out shopping with his wife for example, at a certain point he knows the shock is coming because he can feel the tension building in his LLE. He also makes sure to do his prone on elbows exercise at home. He no longer feels LE cramping overnight, but he does continue to feel LE weakness after standing 30 minutes. No history of hip pain or hip pathology. He has gardened once since last visit, but pain was not as bad as the last post-gardening episode. When he was here 2 wks ago he turned his ankle on the steps. He states that happened once again at home. Pain: 0/10 no pain thus far today in back/leg  @eval: Dayna Ronquillo is a 68year old male who presents to therapy today with complaints of intermittent LBP and Rt sciatica over several years on-off, worse in past few months c gardening and yardwork. Has heard dry needling helps and would like to try. Previous bouts of PT for this issue but it has been at least 5 yrs. B low back and into Rt buttock to knee or ankle. Intermittent foot numbness. Severe 'shocks' of pain and BLE cramping overnight (some improvement c medication adjustment 6-8 mo ago), disturbed sleep. Feels weakness in BLEs after standing >30 min. Pt reports \"L5 disc issue, degeneration\", had injections x3 in 1997 c some improvement; repeated in 2020 x2 c some relief. Takes Diclofenac nightly, which helps. Has not been holding back on any activities. Likes treadmill and feels it helps, but hasn't done lately 2o needing to be fixed.   Work :Used to work in car sales which requires prolonged standing. Has considered returning, but not with this pain. Pt describes pain level at best 0/10, at worst 10/10. Current functional limitations include intermittent pains at any time, BLE cramping overnight makes his legs feel weak. Octavia Ford describes prior level of function decreased severity and frequency of pain, less cramping/weakness. Pt goals include decrease pain and LE cramping/feeling of weakness. Past medical history was reviewed with Octavia Ford. Significant findings include hyperlipidemia, Htn, CAD, HLD, HTN, PAD   Pt denies diplopia, dysarthria, dysphasia, dizziness, drop attacks, bowel/bladder changes, saddle anesthesia, and ZAYDA LE N/T. Objective:   DTRs: 1+ B patellar, 0 B achilles (vs eval: 1+ B achilles)  MMT: Lf ankle DF and PF are 5/5   B hip PROM WNL and painfree  (-) scour Lf hip    Treatment:  (\"NP\" indicates Not Performed this date)  Manual Therapy:  Pt educated on intramuscular trigger point dry needling via solid filament needles. Instructions, precautions, risks and benefits thoroughly explained. Obtained verbal informed consent prior to application of dry needling treatment on 7-27-23 and agreed to treatment today. Pt denied any of the following relative or absolute contraindications (on initial consent date above): hx of fainting/fear of needles, hx of bleeding disorder c impaired blood clotting, currently taking anticoagulants, pregnancy, currently taking antibiotics for infection, damaged heart valve or other risk of infection, presence of cancer or taking immunosuppressants, diabetic/impaired wound healing, metal allergy.     Technique applied today to the following muscles: Lf and Rt glut med/min, Lf and R QL, Lf and Rt psoas, Lf lateral HS, Lf vastus lateralis, Lf rectus femoris  Functional assessment sign (pre-test): none, symptoms are intermittent, has not had yet today  Adverse effects (Y/N): N  Re-assessment sign (post-test): n/a (asymptomatic)    Proper hemostasis was applied post-treatment to limit bruising/bleeding. Pt educated to drink plenty of water, ice/heat the area PRN, and the possibility of post-tx muscle soreness/fatigue. Pt confirmed understanding. central PA T4-S1 spine gr 2-3-NP    Neuromuscular Re-education:  Pt ed dx, px, plan of care, HEP    Therapeutic Exercise:  Seated thoracic ext c HBH 3\"x10  hooklying LTR 2min    NP today:  GUSTAVO 5\"x10  Knee to opposite shoulder stretch 20\"x3 ea Rt, Lf   seated core stab c minimarch 2min      HEP:   Intial- Knee to opposite shoulder stretch  8-1: add LTR, GUSTAVO, seated thoracic ext    Assessment/Plan: pt's has no signs of hip pathology c good hip mobility and (-) scour test. Impaired reflexes support lumbar pathology. Pain is sharp and brief, intermittent has has never been observed during session. But per pt report, the intensity of these pains is improving. Continue to work toward lumbar mobility and hip/core strength to promote intrinsic support to lumbopelvic region. PLAN OF CARE:    Goals: (to be met in 8 visits)   Consistently decr pain < or = 3/10 intermittent for incr QOL and activity tolerance  Overall incr in function as indicated by decr Oswestry at least 10 pts  Pt reports decr frequency of shooting pains for incr QOL  Pt reports decr feeling of LE weakness and cramping for incr QOL  Indep HEP to promote cont progress toward functional goals    Frequency / Duration: Patient will be seen for 1-2 x/week or a total of 8 visits over a 90 day period. All Treatments performed at distinct and separate times during the therapy session.   Treatment Minutes  Units charged   Manual Therapy 30 minutes 2   Therapeutic Activity 0 minutes    Neuromuscular Re-education 10 minutes 1   Therapeutic Exercise 5 minutes    Total Direct Treatment Time 45 minutes

## 2023-08-29 ENCOUNTER — OFFICE VISIT (OUTPATIENT)
Dept: PHYSICAL THERAPY | Age: 78
End: 2023-08-29
Attending: INTERNAL MEDICINE
Payer: MEDICARE

## 2023-08-29 DIAGNOSIS — M54.16 LUMBAR RADICULOPATHY: Primary | ICD-10-CM

## 2023-08-29 PROCEDURE — 97140 MANUAL THERAPY 1/> REGIONS: CPT

## 2023-08-29 PROCEDURE — 97110 THERAPEUTIC EXERCISES: CPT

## 2023-09-07 ENCOUNTER — OFFICE VISIT (OUTPATIENT)
Dept: PHYSICAL THERAPY | Age: 78
End: 2023-09-07
Attending: INTERNAL MEDICINE
Payer: MEDICARE

## 2023-09-07 DIAGNOSIS — M54.16 LUMBAR RADICULOPATHY: Primary | ICD-10-CM

## 2023-09-07 PROCEDURE — 97110 THERAPEUTIC EXERCISES: CPT

## 2023-09-07 PROCEDURE — 97140 MANUAL THERAPY 1/> REGIONS: CPT

## 2023-09-07 NOTE — PROGRESS NOTES
PT DAILY NOTE  Diagnosis:   Lumbar radiculopathy (M54.16)    Referring Provider: Jackie Lai MD Date of Evaluation:    7/26/2023    Precautions:  None Next MD visit:   none scheduled  Date of Surgery: n/a  Symptom onset: chronic 5 yrs, worse past few months     Insurance Primary/Secondary: [facility]     Visit 6 of 8   Date POC Expires: 9-30-23       Subjective: Pt states he feels he might have pain if he goes for a walk with his wife, so he hasn't in several months. Pt feels he has had 35% improvement based on his LE cramping is less frequent. Pt states he perceives that it is specifically the dry needling that is helping him. Pain: 0/10 no pain thus far today in back/leg  @eval: Lindaann Galeazzi is a 68year old male who presents to therapy today with complaints of intermittent LBP and Rt sciatica over several years on-off, worse in past few months c gardening and yardwork. Has heard dry needling helps and would like to try. Previous bouts of PT for this issue but it has been at least 5 yrs. B low back and into Rt buttock to knee or ankle. Intermittent foot numbness. Severe 'shocks' of pain and BLE cramping overnight (some improvement c medication adjustment 6-8 mo ago), disturbed sleep. Feels weakness in BLEs after standing >30 min. Pt reports \"L5 disc issue, degeneration\", had injections x3 in 1997 c some improvement; repeated in 2020 x2 c some relief. Takes Diclofenac nightly, which helps. Has not been holding back on any activities. Likes treadmill and feels it helps, but hasn't done lately 2o needing to be fixed. Work :Used to work in car sales which requires prolonged standing. Has considered returning, but not with this pain. Pt describes pain level at best 0/10, at worst 10/10. Current functional limitations include intermittent pains at any time, BLE cramping overnight makes his legs feel weak.    Dung Ball describes prior level of function decreased severity and frequency of pain, less cramping/weakness. Pt goals include decrease pain and LE cramping/feeling of weakness. Past medical history was reviewed with Eriberto Hernandez. Significant findings include hyperlipidemia, Htn, CAD, HLD, HTN, PAD   Pt denies diplopia, dysarthria, dysphasia, dizziness, drop attacks, bowel/bladder changes, saddle anesthesia, and ZAYDA LE N/T. Objective:   Encouraged pt to walk daily as this is good for his spine, and is required to progress his walking tolerance. Pt observed jogging several steps to front of clinic to reach his phone s LOB or difficulty    Treatment:  (\"NP\" indicates Not Performed this date)  Manual Therapy:  Pt educated on intramuscular trigger point dry needling via solid filament needles. Instructions, precautions, risks and benefits thoroughly explained. Obtained verbal informed consent prior to application of dry needling treatment on 7-27-23 and agreed to treatment today. Pt denied any of the following relative or absolute contraindications (on initial consent date above): hx of fainting/fear of needles, hx of bleeding disorder c impaired blood clotting, currently taking anticoagulants, pregnancy, currently taking antibiotics for infection, damaged heart valve or other risk of infection, presence of cancer or taking immunosuppressants, diabetic/impaired wound healing, metal allergy. Technique applied today to the following muscles: Lf glut med/min, Lf L4-5 paraspinals  Functional assessment sign (pre-test): none, symptoms are intermittent, has not had yet today  Adverse effects (Y/N): N  Re-assessment sign (post-test): n/a (asymptomatic)    Proper hemostasis was applied post-treatment to limit bruising/bleeding. Pt educated to drink plenty of water, ice/heat the area PRN, and the possibility of post-tx muscle soreness/fatigue. Pt confirmed understanding.     STM B lumbar paraspinals  Added skin-rolling MFR B lumbar parsapinals    Neuromuscular Re-education:    Therapeutic Exercise:  GUSTAVO 10\"x10  hooklying LTR 2min  bridges 5\"x10  seated core stab c minimarch 2min  sideglides Rt side to wall for Lf quadrant extension 3\"x10  sidestepping 1min  fwd/retro MW 2min  Added treadmill walk at self-selected speed (1.7mph)  5min    NP today:  Knee to opposite shoulder stretch 20\"x3 ea Rt, Lf   Seated thoracic ext c HBH 3\"x10      HEP:   Intial- Knee to opposite shoulder stretch  8-1: add LTR, GUSTAVO, seated thoracic ext    Assessment/Plan: Difficult to assess progress as pt is consistently asymptomatic during PT sessions, however pt reports that frequency of LE cramping overnight is improving. Pt is focused on his perceived benefit from dry needling, though PT plan of care is multi-modal. Pt reports avoidance of going for walks for several months 2o fear of symptoms and/or LE weakness. Added treadwill walk today which pt tolerated well for 5min. Encouraged pt to take daily walks in order to progress his walking tolerance, per specificity of training principle. Cont to progress LE strength and walking tolerance as able. PLAN OF CARE:    Goals: (to be met in 8 visits)   Consistently decr pain < or = 3/10 intermittent for incr QOL and activity tolerance  Overall incr in function as indicated by decr Oswestry at least 10 pts  Pt reports decr frequency of shooting pains for incr QOL  Pt reports decr feeling of LE weakness and cramping for incr QOL  Indep HEP to promote cont progress toward functional goals    Frequency / Duration: Patient will be seen for 1-2 x/week or a total of 8 visits over a 90 day period. All Treatments performed at distinct and separate times during the therapy session.   Treatment Minutes  Units charged   Manual Therapy 20 minutes 1   Therapeutic Activity 0 minutes    Neuromuscular Re-education 0 minutes    Therapeutic Exercise 25 minutes 2   Total Direct Treatment Time 40 minutes

## 2023-09-13 ENCOUNTER — APPOINTMENT (OUTPATIENT)
Dept: PHYSICAL THERAPY | Age: 78
End: 2023-09-13
Attending: INTERNAL MEDICINE
Payer: MEDICARE

## 2023-09-20 ENCOUNTER — APPOINTMENT (OUTPATIENT)
Dept: PHYSICAL THERAPY | Age: 78
End: 2023-09-20
Attending: INTERNAL MEDICINE
Payer: MEDICARE

## 2023-10-26 ENCOUNTER — HOSPITAL ENCOUNTER (OUTPATIENT)
Dept: LAB | Age: 78
Discharge: HOME | End: 2023-10-26

## 2023-10-26 DIAGNOSIS — Z12.5 ENCOUNTER FOR SCREENING FOR MALIGNANT NEOPLASM OF PROSTATE: ICD-10-CM

## 2023-10-26 DIAGNOSIS — I25.10 ATHEROSCLEROTIC HEART DISEASE OF NATIVE CORONARY ARTERY WITHOUT ANGINA PECTORIS: ICD-10-CM

## 2023-10-26 DIAGNOSIS — I10 ESSENTIAL (PRIMARY) HYPERTENSION: ICD-10-CM

## 2023-10-26 DIAGNOSIS — E78.5 HYPERLIPIDEMIA, UNSPECIFIED: ICD-10-CM

## 2023-10-26 DIAGNOSIS — K21.00 GASTRO-ESOPHAGEAL REFLUX DISEASE WITH ESOPHAGITIS, WITHOUT BLEEDING: ICD-10-CM

## 2023-10-26 DIAGNOSIS — E78.5 HYPERLIPIDEMIA, UNSPECIFIED: Primary | ICD-10-CM

## 2023-10-26 LAB
ALBUMIN SERPL-MCNC: 3.7 G/DL (ref 3.6–5.1)
ALBUMIN/GLOB SERPL: 1.2 {RATIO} (ref 1–2.4)
ALP SERPL-CCNC: 81 UNITS/L (ref 45–117)
ALT SERPL-CCNC: 30 UNITS/L
ANION GAP SERPL CALC-SCNC: 11 MMOL/L (ref 7–19)
APPEARANCE UR: CLEAR
AST SERPL-CCNC: 22 UNITS/L
BASOPHILS # BLD: 0.1 K/MCL (ref 0–0.3)
BASOPHILS NFR BLD: 1 %
BILIRUB SERPL-MCNC: 0.6 MG/DL (ref 0.2–1)
BILIRUB UR QL STRIP: NEGATIVE
BUN SERPL-MCNC: 17 MG/DL (ref 6–20)
BUN/CREAT SERPL: 14 (ref 7–25)
CALCIUM SERPL-MCNC: 9.2 MG/DL (ref 8.4–10.2)
CHLORIDE SERPL-SCNC: 105 MMOL/L (ref 97–110)
CHOLEST SERPL-MCNC: 136 MG/DL
CHOLEST/HDLC SERPL: 1.9 {RATIO}
CO2 SERPL-SCNC: 29 MMOL/L (ref 21–32)
COLOR UR: NORMAL
CREAT SERPL-MCNC: 1.24 MG/DL (ref 0.67–1.17)
DEPRECATED RDW RBC: 41.7 FL (ref 39–50)
EGFRCR SERPLBLD CKD-EPI 2021: 60 ML/MIN/{1.73_M2}
EOSINOPHIL # BLD: 0.3 K/MCL (ref 0–0.5)
EOSINOPHIL NFR BLD: 4 %
ERYTHROCYTE [DISTWIDTH] IN BLOOD: 11.6 % (ref 11–15)
FASTING DURATION TIME PATIENT: 14 HOURS (ref 0–999)
GLOBULIN SER-MCNC: 3.2 G/DL (ref 2–4)
GLUCOSE SERPL-MCNC: 97 MG/DL (ref 70–99)
GLUCOSE UR STRIP-MCNC: NEGATIVE MG/DL
HCT VFR BLD CALC: 37.1 % (ref 39–51)
HDLC SERPL-MCNC: 70 MG/DL
HGB BLD-MCNC: 11.5 G/DL (ref 13–17)
HGB UR QL STRIP: NEGATIVE
IMM GRANULOCYTES # BLD AUTO: 0 K/MCL (ref 0–0.2)
IMM GRANULOCYTES # BLD: 0 %
KETONES UR STRIP-MCNC: NEGATIVE MG/DL
LDLC SERPL CALC-MCNC: 55 MG/DL
LEUKOCYTE ESTERASE UR QL STRIP: NEGATIVE
LYMPHOCYTES # BLD: 1.4 K/MCL (ref 1–4)
LYMPHOCYTES NFR BLD: 19 %
MCH RBC QN AUTO: 30.2 PG (ref 26–34)
MCHC RBC AUTO-ENTMCNC: 31 G/DL (ref 32–36.5)
MCV RBC AUTO: 97.4 FL (ref 78–100)
MONOCYTES # BLD: 0.8 K/MCL (ref 0.3–0.9)
MONOCYTES NFR BLD: 11 %
NEUTROPHILS # BLD: 4.8 K/MCL (ref 1.8–7.7)
NEUTROPHILS NFR BLD: 65 %
NITRITE UR QL STRIP: NEGATIVE
NONHDLC SERPL-MCNC: 66 MG/DL
NRBC BLD MANUAL-RTO: 0 /100 WBC
PH UR STRIP: 6 [PH] (ref 5–7)
PLATELET # BLD AUTO: 315 K/MCL (ref 140–450)
POTASSIUM SERPL-SCNC: 4.5 MMOL/L (ref 3.4–5.1)
PROT SERPL-MCNC: 6.9 G/DL (ref 6.4–8.2)
PROT UR STRIP-MCNC: NEGATIVE MG/DL
PSA SERPL-MCNC: 1.99 NG/ML
RBC # BLD: 3.81 MIL/MCL (ref 4.5–5.9)
SODIUM SERPL-SCNC: 140 MMOL/L (ref 135–145)
SP GR UR STRIP: 1.01 (ref 1–1.03)
TRIGL SERPL-MCNC: 56 MG/DL
TSH SERPL-ACNC: 0.57 MCUNITS/ML (ref 0.35–5)
UROBILINOGEN UR STRIP-MCNC: 0.2 MG/DL
WBC # BLD: 7.3 K/MCL (ref 4.2–11)

## 2023-10-26 PROCEDURE — 84153 ASSAY OF PSA TOTAL: CPT | Performed by: INTERNAL MEDICINE

## 2023-10-26 PROCEDURE — 36415 COLL VENOUS BLD VENIPUNCTURE: CPT | Performed by: INTERNAL MEDICINE

## 2023-10-26 PROCEDURE — 80053 COMPREHEN METABOLIC PANEL: CPT | Performed by: INTERNAL MEDICINE

## 2023-10-26 PROCEDURE — 84443 ASSAY THYROID STIM HORMONE: CPT | Performed by: INTERNAL MEDICINE

## 2023-10-26 PROCEDURE — 85025 COMPLETE CBC W/AUTO DIFF WBC: CPT | Performed by: INTERNAL MEDICINE

## 2023-10-26 PROCEDURE — 80061 LIPID PANEL: CPT | Performed by: INTERNAL MEDICINE

## 2023-10-26 PROCEDURE — 81003 URINALYSIS AUTO W/O SCOPE: CPT | Performed by: CLINICAL MEDICAL LABORATORY

## 2024-07-11 PROBLEM — E78.5 DYSLIPIDEMIA, GOAL LDL BELOW 70: Status: ACTIVE | Noted: 2022-02-28

## 2024-07-11 PROBLEM — M65.842 STENOSING TENOSYNOVITIS OF FINGER OF LEFT HAND: Status: ACTIVE | Noted: 2024-07-11

## 2024-07-11 PROBLEM — R60.0 BILATERAL LEG EDEMA: Status: ACTIVE | Noted: 2024-07-11

## 2024-07-11 PROBLEM — K21.9 GASTROESOPHAGEAL REFLUX DISEASE: Status: ACTIVE | Noted: 2024-07-11

## 2024-08-31 PROBLEM — I73.9 PAD (PERIPHERAL ARTERY DISEASE) (CMD): Status: RESOLVED | Noted: 2022-02-28 | Resolved: 2024-08-31

## 2024-08-31 PROBLEM — S13.9XXA ACUTE CERVICAL SPRAIN, INITIAL ENCOUNTER: Status: ACTIVE | Noted: 2024-08-31

## 2024-09-09 PROBLEM — S13.9XXD ACUTE CERVICAL SPRAIN, SUBSEQUENT ENCOUNTER: Status: ACTIVE | Noted: 2024-08-31

## 2024-09-16 PROBLEM — R51.9 LEFT-SIDED HEADACHE: Status: ACTIVE | Noted: 2024-09-16

## 2024-09-16 PROBLEM — M54.12 CERVICAL RADICULOPATHY: Status: ACTIVE | Noted: 2024-09-16

## 2024-09-17 ENCOUNTER — HOSPITAL ENCOUNTER (OUTPATIENT)
Dept: GENERAL RADIOLOGY | Age: 79
Discharge: HOME | End: 2024-09-17

## 2024-09-17 DIAGNOSIS — S13.9XXD ACUTE CERVICAL SPRAIN, SUBSEQUENT ENCOUNTER: ICD-10-CM

## 2024-09-17 PROCEDURE — 72050 X-RAY EXAM NECK SPINE 4/5VWS: CPT

## 2024-09-19 ENCOUNTER — HOSPITAL ENCOUNTER (EMERGENCY)
Age: 79
Discharge: HOME | End: 2024-09-19
Attending: EMERGENCY MEDICINE

## 2024-09-19 ENCOUNTER — APPOINTMENT (OUTPATIENT)
Dept: CT IMAGING | Age: 79
End: 2024-09-19
Attending: EMERGENCY MEDICINE

## 2024-09-19 VITALS
TEMPERATURE: 98.4 F | SYSTOLIC BLOOD PRESSURE: 133 MMHG | WEIGHT: 160.94 LBS | DIASTOLIC BLOOD PRESSURE: 68 MMHG | RESPIRATION RATE: 16 BRPM | OXYGEN SATURATION: 100 % | BODY MASS INDEX: 23.84 KG/M2 | HEIGHT: 69 IN | HEART RATE: 59 BPM

## 2024-09-19 DIAGNOSIS — M54.81 OCCIPITAL NEURALGIA OF LEFT SIDE: Primary | ICD-10-CM

## 2024-09-19 LAB
ANION GAP SERPL CALC-SCNC: 7 MMOL/L (ref 7–19)
BASOPHILS # BLD: 0.1 K/MCL (ref 0–0.3)
BASOPHILS NFR BLD: 1 %
BUN SERPL-MCNC: 11 MG/DL (ref 6–20)
BUN/CREAT SERPL: 10 (ref 7–25)
CALCIUM SERPL-MCNC: 9.3 MG/DL (ref 8.4–10.2)
CHLORIDE SERPL-SCNC: 100 MMOL/L (ref 97–110)
CO2 SERPL-SCNC: 26 MMOL/L (ref 21–32)
CREAT SERPL-MCNC: 1.12 MG/DL (ref 0.67–1.17)
DEPRECATED RDW RBC: 38.5 FL (ref 39–50)
EGFRCR SERPLBLD CKD-EPI 2021: 67 ML/MIN/{1.73_M2}
EOSINOPHIL # BLD: 0.2 K/MCL (ref 0–0.5)
EOSINOPHIL NFR BLD: 2 %
ERYTHROCYTE [DISTWIDTH] IN BLOOD: 11.3 % (ref 11–15)
FASTING DURATION TIME PATIENT: ABNORMAL H
GLUCOSE SERPL-MCNC: 98 MG/DL (ref 70–99)
HCT VFR BLD CALC: 29.4 % (ref 39–51)
HGB BLD-MCNC: 9.8 G/DL (ref 13–17)
IMM GRANULOCYTES # BLD AUTO: 0 K/MCL (ref 0–0.2)
IMM GRANULOCYTES # BLD: 0 %
LYMPHOCYTES # BLD: 1.2 K/MCL (ref 1–4)
LYMPHOCYTES NFR BLD: 13 %
MCH RBC QN AUTO: 30.8 PG (ref 26–34)
MCHC RBC AUTO-ENTMCNC: 33.3 G/DL (ref 32–36.5)
MCV RBC AUTO: 92.5 FL (ref 78–100)
MONOCYTES # BLD: 0.9 K/MCL (ref 0.3–0.9)
MONOCYTES NFR BLD: 10 %
NEUTROPHILS # BLD: 6.9 K/MCL (ref 1.8–7.7)
NEUTROPHILS NFR BLD: 74 %
NRBC BLD MANUAL-RTO: 0 /100 WBC
PLATELET # BLD AUTO: 299 K/MCL (ref 140–450)
POTASSIUM SERPL-SCNC: 4 MMOL/L (ref 3.4–5.1)
RBC # BLD: 3.18 MIL/MCL (ref 4.5–5.9)
SODIUM SERPL-SCNC: 129 MMOL/L (ref 135–145)
WBC # BLD: 9.2 K/MCL (ref 4.2–11)

## 2024-09-19 PROCEDURE — 10002800 HB RX 250 W HCPCS: Performed by: EMERGENCY MEDICINE

## 2024-09-19 PROCEDURE — 10002803 HB RX 637: Performed by: EMERGENCY MEDICINE

## 2024-09-19 PROCEDURE — 10002807 HB RX 258: Performed by: EMERGENCY MEDICINE

## 2024-09-19 PROCEDURE — 70450 CT HEAD/BRAIN W/O DYE: CPT

## 2024-09-19 PROCEDURE — 85025 COMPLETE CBC W/AUTO DIFF WBC: CPT | Performed by: EMERGENCY MEDICINE

## 2024-09-19 PROCEDURE — 80048 BASIC METABOLIC PNL TOTAL CA: CPT | Performed by: EMERGENCY MEDICINE

## 2024-09-19 RX ORDER — METOCLOPRAMIDE HYDROCHLORIDE 5 MG/ML
10 INJECTION INTRAMUSCULAR; INTRAVENOUS ONCE
Status: COMPLETED | OUTPATIENT
Start: 2024-09-19 | End: 2024-09-19

## 2024-09-19 RX ORDER — CARBAMAZEPINE 200 MG/1
200 TABLET ORAL 2 TIMES DAILY
Qty: 28 TABLET | Refills: 0 | Status: SHIPPED | OUTPATIENT
Start: 2024-09-19 | End: 2024-10-03

## 2024-09-19 RX ORDER — CARBAMAZEPINE 200 MG/1
200 TABLET ORAL ONCE
Status: COMPLETED | OUTPATIENT
Start: 2024-09-19 | End: 2024-09-19

## 2024-09-19 RX ORDER — DIPHENHYDRAMINE HYDROCHLORIDE 50 MG/ML
12.5 INJECTION INTRAMUSCULAR; INTRAVENOUS ONCE
Status: COMPLETED | OUTPATIENT
Start: 2024-09-19 | End: 2024-09-19

## 2024-09-19 RX ADMIN — DIPHENHYDRAMINE HYDROCHLORIDE 12.5 MG: 50 INJECTION, SOLUTION INTRAMUSCULAR; INTRAVENOUS at 13:15

## 2024-09-19 RX ADMIN — SODIUM CHLORIDE 1000 ML: 9 INJECTION, SOLUTION INTRAVENOUS at 13:15

## 2024-09-19 RX ADMIN — METOCLOPRAMIDE 10 MG: 5 INJECTION, SOLUTION INTRAMUSCULAR; INTRAVENOUS at 13:17

## 2024-09-19 RX ADMIN — CARBAMAZEPINE 200 MG: 200 TABLET ORAL at 13:24

## 2024-09-19 ASSESSMENT — PAIN SCALES - GENERAL
PAINLEVEL_OUTOF10: 7
PAINLEVEL_OUTOF10: 0

## 2024-09-19 ASSESSMENT — PAIN DESCRIPTION - PAIN TYPE: TYPE: ACUTE PAIN

## 2024-09-21 PROBLEM — D53.9 ANEMIA ASSOCIATED WITH NUTRITIONAL DEFICIENCY: Status: ACTIVE | Noted: 2024-09-21

## 2024-09-21 PROBLEM — E87.1 HYPONATREMIA: Status: ACTIVE | Noted: 2024-09-21

## 2024-09-23 ENCOUNTER — LAB SERVICES (OUTPATIENT)
Dept: LAB | Age: 79
End: 2024-09-23

## 2024-10-02 ENCOUNTER — APPOINTMENT (OUTPATIENT)
Dept: MRI IMAGING | Age: 79
End: 2024-10-02
Attending: INTERNAL MEDICINE

## 2025-03-17 RX ORDER — LISINOPRIL 40 MG/1
40 TABLET ORAL DAILY
Qty: 90 TABLET | Refills: 0 | OUTPATIENT
Start: 2025-03-17

## (undated) DEVICE — Device

## (undated) DEVICE — LAWSON - LINE MONITOR HP CONTRAST LF

## (undated) DEVICE — LAWSON - TUBING HP INJ FLEX 72IN ADPTR